# Patient Record
Sex: FEMALE | Race: OTHER | HISPANIC OR LATINO
[De-identification: names, ages, dates, MRNs, and addresses within clinical notes are randomized per-mention and may not be internally consistent; named-entity substitution may affect disease eponyms.]

---

## 2017-12-06 PROBLEM — Z00.00 ENCOUNTER FOR PREVENTIVE HEALTH EXAMINATION: Status: ACTIVE | Noted: 2017-12-06

## 2018-01-23 ENCOUNTER — APPOINTMENT (OUTPATIENT)
Dept: ENDOCRINOLOGY | Facility: CLINIC | Age: 38
End: 2018-01-23
Payer: SELF-PAY

## 2018-01-23 VITALS
OXYGEN SATURATION: 99 % | SYSTOLIC BLOOD PRESSURE: 110 MMHG | RESPIRATION RATE: 16 BRPM | HEART RATE: 62 BPM | DIASTOLIC BLOOD PRESSURE: 80 MMHG | WEIGHT: 105 LBS

## 2018-01-23 DIAGNOSIS — Z78.9 OTHER SPECIFIED HEALTH STATUS: ICD-10-CM

## 2018-01-23 PROCEDURE — 99204 OFFICE O/P NEW MOD 45 MIN: CPT

## 2018-01-23 RX ORDER — LEVOTHYROXINE SODIUM 0.17 MG/1
TABLET ORAL
Refills: 0 | Status: COMPLETED | COMMUNITY

## 2018-03-19 ENCOUNTER — APPOINTMENT (OUTPATIENT)
Dept: ENDOCRINOLOGY | Facility: CLINIC | Age: 38
End: 2018-03-19
Payer: SELF-PAY

## 2018-03-19 VITALS
WEIGHT: 106 LBS | SYSTOLIC BLOOD PRESSURE: 100 MMHG | OXYGEN SATURATION: 99 % | HEIGHT: 63 IN | BODY MASS INDEX: 18.78 KG/M2 | HEART RATE: 73 BPM | DIASTOLIC BLOOD PRESSURE: 60 MMHG | RESPIRATION RATE: 16 BRPM

## 2018-03-19 LAB — GLUCOSE BLDC GLUCOMTR-MCNC: 95

## 2018-03-19 PROCEDURE — 99214 OFFICE O/P EST MOD 30 MIN: CPT | Mod: 25

## 2018-03-19 PROCEDURE — 82962 GLUCOSE BLOOD TEST: CPT

## 2018-03-19 RX ORDER — DEXAMETHASONE 1 MG/1
1 TABLET ORAL
Qty: 1 | Refills: 1 | Status: COMPLETED | COMMUNITY
Start: 2018-01-23 | End: 2018-03-19

## 2018-06-11 ENCOUNTER — APPOINTMENT (OUTPATIENT)
Dept: ENDOCRINOLOGY | Facility: CLINIC | Age: 38
End: 2018-06-11
Payer: SELF-PAY

## 2018-06-11 VITALS
DIASTOLIC BLOOD PRESSURE: 64 MMHG | HEART RATE: 69 BPM | RESPIRATION RATE: 16 BRPM | OXYGEN SATURATION: 98 % | WEIGHT: 102 LBS | BODY MASS INDEX: 18.07 KG/M2 | HEIGHT: 63 IN | SYSTOLIC BLOOD PRESSURE: 94 MMHG

## 2018-06-11 DIAGNOSIS — Z86.39 PERSONAL HISTORY OF OTHER ENDOCRINE, NUTRITIONAL AND METABOLIC DISEASE: ICD-10-CM

## 2018-06-11 PROCEDURE — 99214 OFFICE O/P EST MOD 30 MIN: CPT

## 2018-12-03 ENCOUNTER — APPOINTMENT (OUTPATIENT)
Dept: ENDOCRINOLOGY | Facility: CLINIC | Age: 38
End: 2018-12-03

## 2019-01-30 ENCOUNTER — CLINICAL ADVICE (OUTPATIENT)
Age: 39
End: 2019-01-30

## 2019-01-30 ENCOUNTER — APPOINTMENT (OUTPATIENT)
Dept: ENDOCRINOLOGY | Facility: CLINIC | Age: 39
End: 2019-01-30
Payer: SELF-PAY

## 2019-01-30 VITALS
RESPIRATION RATE: 16 BRPM | WEIGHT: 109 LBS | HEIGHT: 63 IN | TEMPERATURE: 98.2 F | SYSTOLIC BLOOD PRESSURE: 88 MMHG | HEART RATE: 67 BPM | BODY MASS INDEX: 19.31 KG/M2 | DIASTOLIC BLOOD PRESSURE: 57 MMHG | OXYGEN SATURATION: 99 %

## 2019-01-30 PROCEDURE — 99214 OFFICE O/P EST MOD 30 MIN: CPT

## 2019-01-30 NOTE — HISTORY OF PRESENT ILLNESS
[FreeTextEntry1] : Patient stopped the thyroid medication about one month ago. She feels well. The TSH came back normal. The Prolactin and IgF-1 were also normal. Patient had an MRI brain 9/17. She is allergic to Gadolinium. She is having reactive hypoglycemia

## 2019-01-30 NOTE — ASSESSMENT
[FreeTextEntry1] : Patient is clinically euthyroid\par The diabetes is well controlled\par Will order an MRI of the pituitary gland (she is allergic to Gadolinium)\par Do not take thyroid medication until next visit\par Will do a pituitary gland work up again\par She has no insurance, she can not pay some of the tests

## 2019-01-30 NOTE — PHYSICAL EXAM
[Alert] : alert [No Acute Distress] : no acute distress [Normal Sclera/Conjunctiva] : normal sclera/conjunctiva [PERRL] : pupils equal, round and reactive to light [Normal Outer Ear/Nose] : the ears and nose were normal in appearance [Normal Hearing] : hearing was normal [No Neck Mass] : no neck mass was observed [Thyroid Not Enlarged] : the thyroid was not enlarged [No Respiratory Distress] : no respiratory distress [Normal Rate and Effort] : normal respiratory rhythm and effort [Normal PMI] : the apical impulse was normal [Normal Rate] : heart rate was normal  [Carotids Normal] : carotid pulses were normal with no bruits [Normal Gait] : normal gait [No Clubbing, Cyanosis] : no clubbing  or cyanosis of the fingernails [No Rash] : no rash [No Skin Lesions] : no skin lesions [Normal Reflexes] : deep tendon reflexes were 2+ and symmetric [No Motor Deficits] : the motor exam was normal

## 2019-10-09 ENCOUNTER — APPOINTMENT (OUTPATIENT)
Dept: ENDOCRINOLOGY | Facility: CLINIC | Age: 39
End: 2019-10-09
Payer: SELF-PAY

## 2019-10-09 VITALS
DIASTOLIC BLOOD PRESSURE: 47 MMHG | SYSTOLIC BLOOD PRESSURE: 86 MMHG | HEIGHT: 63 IN | OXYGEN SATURATION: 100 % | BODY MASS INDEX: 19.49 KG/M2 | RESPIRATION RATE: 16 BRPM | TEMPERATURE: 97.4 F | HEART RATE: 67 BPM | WEIGHT: 110 LBS

## 2019-10-09 PROCEDURE — 99214 OFFICE O/P EST MOD 30 MIN: CPT

## 2019-10-09 RX ORDER — LEVOTHYROXINE SODIUM 0.03 MG/1
25 TABLET ORAL
Qty: 60 | Refills: 3 | Status: COMPLETED | COMMUNITY
End: 2019-10-09

## 2019-10-09 NOTE — HISTORY OF PRESENT ILLNESS
[FreeTextEntry1] : The patient feels well, following the diet and exercising, the 3 hour GTT revealed a borderline elevated 2h glucose. The FBS and HbA1c were normal. The Prolactin was normal. The MRI of the brain was negative for the pituitary gland. Advised to see the ENT for the sinuses findings

## 2019-10-09 NOTE — DATA REVIEWED
[FreeTextEntry1] : The Prolactin was normal, HbA1c was fine but the 2h glucose from the GTT disclosed prediabetes. The MRI of the pituitary gland was fine but it showed a lesion in the sinuses

## 2019-10-09 NOTE — PHYSICAL EXAM
[Alert] : alert [Normal Sclera/Conjunctiva] : normal sclera/conjunctiva [No Acute Distress] : no acute distress [PERRL] : pupils equal, round and reactive to light [No Neck Mass] : no neck mass was observed [Normal Outer Ear/Nose] : the ears and nose were normal in appearance [Normal Hearing] : hearing was normal [Normal Rate and Effort] : normal respiratory rhythm and effort [Thyroid Not Enlarged] : the thyroid was not enlarged [No Respiratory Distress] : no respiratory distress [Normal PMI] : the apical impulse was normal [Carotids Normal] : carotid pulses were normal with no bruits [Normal Rate] : heart rate was normal  [No Clubbing, Cyanosis] : no clubbing  or cyanosis of the fingernails [No Rash] : no rash [Normal Gait] : normal gait [No Motor Deficits] : the motor exam was normal [No Skin Lesions] : no skin lesions [Normal Reflexes] : deep tendon reflexes were 2+ and symmetric

## 2019-10-09 NOTE — ASSESSMENT
[FreeTextEntry1] : The MRI of the brain disclosed no pituitary tumor\par She has a lesion in the sinuses\par She has borderline prediabetes\par Given a low CHO, low fat diet\par Advised to see an ENT doctor

## 2020-08-12 ENCOUNTER — APPOINTMENT (OUTPATIENT)
Dept: ENDOCRINOLOGY | Facility: CLINIC | Age: 40
End: 2020-08-12

## 2021-05-20 LAB
ANION GAP SERPL CALC-SCNC: 11 MMOL/L
BASOPHILS # BLD AUTO: 0.03 K/UL
BASOPHILS NFR BLD AUTO: 0.4 %
BUN SERPL-MCNC: 15 MG/DL
C PEPTIDE SERPL-MCNC: 0.9 NG/ML
CALCIUM SERPL-MCNC: 9.5 MG/DL
CHLORIDE SERPL-SCNC: 104 MMOL/L
CHOLEST SERPL-MCNC: 172 MG/DL
CO2 SERPL-SCNC: 25 MMOL/L
CORTIS SERPL-MCNC: 1 UG/DL
CREAT SERPL-MCNC: 0.78 MG/DL
EOSINOPHIL # BLD AUTO: 0.76 K/UL
EOSINOPHIL NFR BLD AUTO: 10.5 %
ESTIMATED AVERAGE GLUCOSE: 111 MG/DL
ESTRADIOL SERPL-MCNC: 89 PG/ML
FSH SERPL-MCNC: 7.5 IU/L
GLUCOSE BS SERPL-MCNC: 103 MG/DL
GLUCOSE SERPL-MCNC: 107 MG/DL
HBA1C MFR BLD HPLC: 5.5 %
HCT VFR BLD CALC: 41.8 %
HDLC SERPL-MCNC: 71 MG/DL
HGB BLD-MCNC: 13.5 G/DL
IMM GRANULOCYTES NFR BLD AUTO: 0.1 %
LDLC SERPL CALC-MCNC: 87 MG/DL
LH SERPL-ACNC: 6.7 IU/L
LYMPHOCYTES # BLD AUTO: 1.56 K/UL
LYMPHOCYTES NFR BLD AUTO: 21.5 %
MAN DIFF?: NORMAL
MCHC RBC-ENTMCNC: 31.7 PG
MCHC RBC-ENTMCNC: 32.3 GM/DL
MCV RBC AUTO: 98.1 FL
MONOCYTES # BLD AUTO: 0.52 K/UL
MONOCYTES NFR BLD AUTO: 7.2 %
NEUTROPHILS # BLD AUTO: 4.39 K/UL
NEUTROPHILS NFR BLD AUTO: 60.3 %
NONHDLC SERPL-MCNC: 101 MG/DL
PLATELET # BLD AUTO: 248 K/UL
POTASSIUM SERPL-SCNC: 4.4 MMOL/L
PROLACTIN SERPL-MCNC: 11.2 NG/ML
RBC # BLD: 4.26 M/UL
RBC # FLD: 12.7 %
SODIUM SERPL-SCNC: 140 MMOL/L
T4 FREE SERPL-MCNC: 1.4 NG/DL
TRIGL SERPL-MCNC: 69 MG/DL
TSH SERPL-ACNC: 2.44 UIU/ML
WBC # FLD AUTO: 7.27 K/UL

## 2021-05-21 LAB
IGF-1 INTERP: NORMAL
IGF-I BLD-MCNC: 96 NG/ML

## 2021-06-29 ENCOUNTER — APPOINTMENT (OUTPATIENT)
Dept: ENDOCRINOLOGY | Facility: CLINIC | Age: 41
End: 2021-06-29
Payer: MEDICAID

## 2021-06-29 VITALS
WEIGHT: 115 LBS | OXYGEN SATURATION: 99 % | HEART RATE: 62 BPM | SYSTOLIC BLOOD PRESSURE: 98 MMHG | BODY MASS INDEX: 24.14 KG/M2 | RESPIRATION RATE: 16 BRPM | TEMPERATURE: 98.2 F | DIASTOLIC BLOOD PRESSURE: 62 MMHG | HEIGHT: 58 IN

## 2021-06-29 DIAGNOSIS — N64.3 GALACTORRHEA NOT ASSOCIATED WITH CHILDBIRTH: ICD-10-CM

## 2021-06-29 PROCEDURE — 99214 OFFICE O/P EST MOD 30 MIN: CPT

## 2021-06-29 NOTE — ASSESSMENT
[FreeTextEntry1] : The endocrine work up was fine\par The FBS was slightly elevated\par Her periods are normal, no galactorrhea\par Will continue observation\par Advised to follow a low CHO diet and exercise\par No need to repeat the MRI of the brain at this time\par Will repeat the US thyroid

## 2021-06-29 NOTE — DATA REVIEWED
[FreeTextEntry1] : The endocrine work up was fine. The FBS was borderline elevated but the HbA1c was fine.

## 2021-06-29 NOTE — HISTORY OF PRESENT ILLNESS
[FreeTextEntry1] : Patient is doing well, denies feeling tired, having cold intolerance, or palpitations. Denies dryness of the skin or hair loss. She denies chest pain or SOB. Taking the Levothyroxine regularly ½ hour before breakfast. Her weight has increased.  No recent blood test. The US thyroid . No galactorrhea. The MRI of the brain done 2019 was fine.\par

## 2021-09-02 ENCOUNTER — NON-APPOINTMENT (OUTPATIENT)
Age: 41
End: 2021-09-02

## 2022-09-02 ENCOUNTER — APPOINTMENT (OUTPATIENT)
Dept: ENDOCRINOLOGY | Facility: CLINIC | Age: 42
End: 2022-09-02

## 2022-09-02 VITALS
HEIGHT: 58 IN | BODY MASS INDEX: 24.56 KG/M2 | HEART RATE: 65 BPM | DIASTOLIC BLOOD PRESSURE: 61 MMHG | WEIGHT: 117 LBS | OXYGEN SATURATION: 98 % | SYSTOLIC BLOOD PRESSURE: 97 MMHG | TEMPERATURE: 97.9 F | RESPIRATION RATE: 16 BRPM

## 2022-09-02 PROCEDURE — 99214 OFFICE O/P EST MOD 30 MIN: CPT

## 2022-09-02 NOTE — ASSESSMENT
[FreeTextEntry1] : The patient is clinically euthyroid\par Will order new blood test\par The last MRI brain done 2019\par Will order a new MRI brain\par The patient will call back for results

## 2022-09-02 NOTE — HISTORY OF PRESENT ILLNESS
[FreeTextEntry1] : Patient is doing well, she has been having on and off pain in the frontal sinus area. No galactorrhea. No change in the size of the shoes. No eye problems.

## 2022-12-13 ENCOUNTER — APPOINTMENT (OUTPATIENT)
Dept: ENDOCRINOLOGY | Facility: CLINIC | Age: 42
End: 2022-12-13

## 2023-06-09 NOTE — REVIEW OF SYSTEMS
Pt notified and verbalized understanding. She states that she does not take milk of magnesia daily.  Pt scheduled for 4 week f/u with Dr. Angelo Leon on 7/6/23 @ 4:00 PM. [Negative] : Heme/Lymph

## 2024-04-18 ENCOUNTER — APPOINTMENT (OUTPATIENT)
Dept: ENDOCRINOLOGY | Facility: CLINIC | Age: 44
End: 2024-04-18
Payer: COMMERCIAL

## 2024-04-18 VITALS
TEMPERATURE: 97.7 F | HEART RATE: 86 BPM | BODY MASS INDEX: 24.14 KG/M2 | RESPIRATION RATE: 16 BRPM | SYSTOLIC BLOOD PRESSURE: 97 MMHG | OXYGEN SATURATION: 99 % | WEIGHT: 115 LBS | DIASTOLIC BLOOD PRESSURE: 63 MMHG | HEIGHT: 58 IN

## 2024-04-18 DIAGNOSIS — R73.03 PREDIABETES.: ICD-10-CM

## 2024-04-18 DIAGNOSIS — D35.2 BENIGN NEOPLASM OF PITUITARY GLAND: ICD-10-CM

## 2024-04-18 DIAGNOSIS — E03.9 HYPOTHYROIDISM, UNSPECIFIED: ICD-10-CM

## 2024-04-18 PROCEDURE — 99214 OFFICE O/P EST MOD 30 MIN: CPT

## 2024-04-18 NOTE — ASSESSMENT
[FreeTextEntry1] : Patient is clinically euthyroid Her weight is stable Will order a new US thyroid before Will order new endocrine work up Will determine if the MRI of the pituitary gland has to be repeated She is allergic to the MRI contrast Will continue same treatment

## 2024-04-18 NOTE — HISTORY OF PRESENT ILLNESS
[FreeTextEntry1] : Patient is doing well, she is keeping her weight down. She denies headaches or eye problems. No galactorrhea. No recent blood tests or US of the thyroid. Her periods are fine, no galactorrhea. No eye problems, dizziness or headaches.

## 2024-05-14 LAB
ACTH SER-ACNC: 13.5 PG/ML
ALBUMIN SERPL ELPH-MCNC: 4.4 G/DL
ALP BLD-CCNC: 65 U/L
ALT SERPL-CCNC: 16 U/L
ANION GAP SERPL CALC-SCNC: 11 MMOL/L
AST SERPL-CCNC: 20 U/L
BILIRUB DIRECT SERPL-MCNC: 0.1 MG/DL
BILIRUB INDIRECT SERPL-MCNC: 0.2 MG/DL
BILIRUB SERPL-MCNC: 0.3 MG/DL
BUN SERPL-MCNC: 13 MG/DL
CALCIUM SERPL-MCNC: 9 MG/DL
CHLORIDE SERPL-SCNC: 105 MMOL/L
CHOLEST SERPL-MCNC: 156 MG/DL
CO2 SERPL-SCNC: 22 MMOL/L
CORTIS SERPL-MCNC: 6.9 UG/DL
CREAT SERPL-MCNC: 0.64 MG/DL
EGFR: 111 ML/MIN/1.73M2
ESTRADIOL SERPL-MCNC: 38 PG/ML
FSH SERPL-MCNC: 7.3 IU/L
GLUCOSE SERPL-MCNC: 108 MG/DL
HDLC SERPL-MCNC: 60 MG/DL
IGF-1 INTERP: NORMAL
IGF-I BLD-MCNC: 96 NG/ML
LDLC SERPL CALC-MCNC: 83 MG/DL
LH SERPL-ACNC: 5.2 IU/L
NONHDLC SERPL-MCNC: 96 MG/DL
POTASSIUM SERPL-SCNC: 4.1 MMOL/L
PROLACTIN SERPL-MCNC: 11.9 NG/ML
PROT SERPL-MCNC: 6.7 G/DL
SODIUM SERPL-SCNC: 138 MMOL/L
T4 FREE SERPL-MCNC: 1.2 NG/DL
TESTOST FREE SERPL-MCNC: 0.2 PG/ML
TESTOST SERPL-MCNC: 9.4 NG/DL
TRIGL SERPL-MCNC: 64 MG/DL
TSH SERPL-ACNC: 2.26 UIU/ML